# Patient Record
Sex: FEMALE | URBAN - METROPOLITAN AREA
[De-identification: names, ages, dates, MRNs, and addresses within clinical notes are randomized per-mention and may not be internally consistent; named-entity substitution may affect disease eponyms.]

---

## 2023-11-09 ENCOUNTER — OFFICE VISIT (OUTPATIENT)
Dept: URBAN - METROPOLITAN AREA CLINIC 35 | Facility: CLINIC | Age: 23
End: 2023-11-09
Payer: COMMERCIAL

## 2023-11-09 VITALS
WEIGHT: 266 LBS | OXYGEN SATURATION: 98 % | HEART RATE: 93 BPM | BODY MASS INDEX: 42.75 KG/M2 | DIASTOLIC BLOOD PRESSURE: 84 MMHG | SYSTOLIC BLOOD PRESSURE: 132 MMHG | HEIGHT: 66 IN

## 2023-11-09 DIAGNOSIS — R19.4 CHANGE IN BOWEL HABITS: ICD-10-CM

## 2023-11-09 DIAGNOSIS — R19.7 DIARRHEA, UNSPECIFIED TYPE: ICD-10-CM

## 2023-11-09 DIAGNOSIS — K92.1 HEMATOCHEZIA: ICD-10-CM

## 2023-11-09 DIAGNOSIS — Z83.719 FAMILY HISTORY OF COLONIC POLYPS: ICD-10-CM

## 2023-11-09 PROCEDURE — 99204 OFFICE O/P NEW MOD 45 MIN: CPT | Performed by: PHYSICIAN ASSISTANT

## 2023-11-09 RX ORDER — LEVOTHYROXINE SODIUM 150 UG/1
1 CAPSULE IN THE MORNING ON AN EMPTY STOMACH CAPSULE ORAL ONCE A DAY
Status: ACTIVE | COMMUNITY

## 2023-11-09 RX ORDER — SODIUM PICOSULFATE, MAGNESIUM OXIDE, AND ANHYDROUS CITRIC ACID 12; 3.5; 1 G/175ML; G/175ML; MG/175ML
175 ML THE FIRST DOSE THE EVENING BEFORE AND SECOND DOSE THE MORNING OF COLONOSCOPY LIQUID ORAL ONCE A DAY
Qty: 350 | Refills: 0 | OUTPATIENT
Start: 2023-11-09 | End: 2023-11-11

## 2023-11-09 NOTE — HPI-BLOOD IN STOOL
23 year old female patient presents today for a consultation about blood in stool. Bleeding started about 3 weeks ago (possibly before) and happens a few times a week. The blood is bright red and is present on tissue and within the water bowl and is enough to stain the water red (at times). Patient currently admits 4-5 bowel movements per day, with some strain. Her stools range from normal to loose. She denies any mucus or melena in stools. She admits to more loose stools than normal.  Occasional nocturnal diarrhea, no weight loss.  She has been having more of an issue for the past month.  She admits to fecal urgency.  Eating does not necessarily provoke symptoms.  She admits rectal pain or pruritus ani. Denies any associated abdominal pain or cramping. In the past, patient was told she possibly had "leaky gut syndrome" but this was never explored. Patient has never had a colonoscopy. Her mother started getting colonoscopies at age 30 and has a history of colon polyps. Patient denies a known family history of colon cancer.

## 2023-12-01 ENCOUNTER — LAB OUTSIDE AN ENCOUNTER (OUTPATIENT)
Dept: URBAN - METROPOLITAN AREA CLINIC 35 | Facility: CLINIC | Age: 23
End: 2023-12-01

## 2023-12-02 LAB
ALBUMIN/GLOBULIN RATIO: 1.6
ALBUMIN: 4.4
ALKALINE PHOSPHATASE: 97
ALT: 15
AST: 14
BILIRUBIN, TOTAL: 0.2
BUN/CREATININE RATIO: (no result)
CALCIUM: 8.8
CARBON DIOXIDE: 23
CHLORIDE: 103
CREATININE: 0.61
GLOBULIN: 2.7
GLUCOSE: 85
HEMATOCRIT: 37.1
HEMOGLOBIN: 11.9
MCH: 25.8
MCHC: 32.1
MCV: 80.3
MPV: 11
PLATELET COUNT: 362
POTASSIUM: 4.2
PROTEIN, TOTAL: 7.1
RDW: 13.9
RED BLOOD CELL COUNT: 4.62
SODIUM: 138
UREA NITROGEN (BUN): 15
WHITE BLOOD CELL COUNT: 10.7

## 2023-12-05 ENCOUNTER — TELEPHONE ENCOUNTER (OUTPATIENT)
Dept: URBAN - METROPOLITAN AREA CLINIC 35 | Facility: CLINIC | Age: 23
End: 2023-12-05

## 2023-12-06 ENCOUNTER — TELEPHONE ENCOUNTER (OUTPATIENT)
Dept: URBAN - METROPOLITAN AREA CLINIC 35 | Facility: CLINIC | Age: 23
End: 2023-12-06

## 2023-12-11 ENCOUNTER — CLAIMS CREATED FROM THE CLAIM WINDOW (OUTPATIENT)
Dept: URBAN - METROPOLITAN AREA SURGERY CENTER 8 | Facility: SURGERY CENTER | Age: 23
End: 2023-12-11
Payer: COMMERCIAL

## 2023-12-11 ENCOUNTER — CLAIMS CREATED FROM THE CLAIM WINDOW (OUTPATIENT)
Dept: URBAN - METROPOLITAN AREA CLINIC 4 | Facility: CLINIC | Age: 23
End: 2023-12-11
Payer: COMMERCIAL

## 2023-12-11 DIAGNOSIS — K64.0 GRADE I HEMORRHOIDS: ICD-10-CM

## 2023-12-11 DIAGNOSIS — K62.5 ANAL BLEEDING: ICD-10-CM

## 2023-12-11 DIAGNOSIS — K63.89 OTHER SPECIFIED DISEASES OF INTESTINE: ICD-10-CM

## 2023-12-11 DIAGNOSIS — K63.89 APPENDICITIS EPIPLOICA: ICD-10-CM

## 2023-12-11 DIAGNOSIS — R19.4 CHANGE IN BOWEL HABIT: ICD-10-CM

## 2023-12-11 DIAGNOSIS — R19.4 ALTERATION IN BOWEL ELIMINATION: ICD-10-CM

## 2023-12-11 DIAGNOSIS — K92.1 HEMATOCHEZIA: ICD-10-CM

## 2023-12-11 PROCEDURE — G8907 PT DOC NO EVENTS ON DISCHARG: HCPCS | Performed by: INTERNAL MEDICINE

## 2023-12-11 PROCEDURE — 45380 COLONOSCOPY AND BIOPSY: CPT | Performed by: INTERNAL MEDICINE

## 2023-12-11 PROCEDURE — 00811 ANES LWR INTST NDSC NOS: CPT | Performed by: NURSE ANESTHETIST, CERTIFIED REGISTERED

## 2023-12-11 PROCEDURE — 88342 IMHCHEM/IMCYTCHM 1ST ANTB: CPT | Performed by: PATHOLOGY

## 2023-12-11 PROCEDURE — 88313 SPECIAL STAINS GROUP 2: CPT | Performed by: PATHOLOGY

## 2023-12-11 PROCEDURE — 88305 TISSUE EXAM BY PATHOLOGIST: CPT | Performed by: PATHOLOGY

## 2024-01-02 ENCOUNTER — OFFICE VISIT (OUTPATIENT)
Dept: URBAN - METROPOLITAN AREA CLINIC 35 | Facility: CLINIC | Age: 24
End: 2024-01-02
Payer: COMMERCIAL

## 2024-01-02 ENCOUNTER — DASHBOARD ENCOUNTERS (OUTPATIENT)
Age: 24
End: 2024-01-02

## 2024-01-02 VITALS
OXYGEN SATURATION: 98 % | SYSTOLIC BLOOD PRESSURE: 120 MMHG | HEIGHT: 66 IN | BODY MASS INDEX: 44.03 KG/M2 | HEART RATE: 78 BPM | DIASTOLIC BLOOD PRESSURE: 64 MMHG | WEIGHT: 274 LBS

## 2024-01-02 DIAGNOSIS — Z83.719 FAMILY HISTORY OF COLONIC POLYPS: ICD-10-CM

## 2024-01-02 DIAGNOSIS — K64.0 GRADE I HEMORRHOIDS: ICD-10-CM

## 2024-01-02 DIAGNOSIS — K58.0 IRRITABLE BOWEL SYNDROME WITH DIARRHEA: ICD-10-CM

## 2024-01-02 DIAGNOSIS — K92.1 HEMATOCHEZIA: ICD-10-CM

## 2024-01-02 PROBLEM — 197125005: Status: ACTIVE | Noted: 2024-01-02

## 2024-01-02 PROCEDURE — 99214 OFFICE O/P EST MOD 30 MIN: CPT | Performed by: PHYSICIAN ASSISTANT

## 2024-01-02 RX ORDER — RIFAXIMIN 550 MG/1
1 TABLET TABLET ORAL THREE TIMES A DAY
Qty: 42 TABLET | Refills: 1 | OUTPATIENT
Start: 2024-01-02 | End: 2024-01-30

## 2024-01-02 RX ORDER — LEVOTHYROXINE SODIUM 150 UG/1
1 CAPSULE IN THE MORNING ON AN EMPTY STOMACH CAPSULE ORAL ONCE A DAY
Status: ACTIVE | COMMUNITY

## 2024-01-02 NOTE — HPI-COLONOSCOPY FOLLOWUP
23 year old female patient presents today for a follow up from her colonoscopy. She denies any complications after her procedure. She currently reports at least one to three bowel movements per day, without strain. Her stools are semi-formed and normal . She denies any mucus, melena or blood in stools. Denies any pruritus ani or rectal pain.  Impression Report:  - The examined portion of the ileum was normal. - Normal mucosa in the entire examined colon.  Biopsied. - The entire examined colon is normal. - Non-bleeding internal hemorrhoids.   Pathology Report:  Colon, Random Biopsy:NO SIGNIFICANT ABNORMALITY

## 2024-01-02 NOTE — HPI-BLOOD IN STOOL
At last visit, 23 year old female patient presents today for a consultation about blood in stool. Bleeding started about 3 weeks ago (possibly before) and happens a few times a week. The blood is bright red and is present on tissue and within the water bowl and is enough to stain the water red (at times). Patient currently admits 4-5 bowel movements per day, with some strain. Her stools range from normal to loose. She denies any mucus or melena in stools. She admits to more loose stools than normal.  Occasional nocturnal diarrhea, no weight loss.  She has been having more of an issue for the past month.  She admits to fecal urgency.  Eating does not necessarily provoke symptoms.  She admits rectal pain or pruritus ani. Denies any associated abdominal pain or cramping. In the past, patient was told she possibly had "leaky gut syndrome" but this was never explored. Patient has never had a colonoscopy. Her mother started getting colonoscopies at age 30 and has a history of colon polyps. Patient denies a known family history of colon cancer.

## 2024-02-12 ENCOUNTER — OV EP (OUTPATIENT)
Dept: URBAN - METROPOLITAN AREA CLINIC 35 | Facility: CLINIC | Age: 24
End: 2024-02-12

## 2024-02-12 RX ORDER — LEVOTHYROXINE SODIUM 150 UG/1
1 CAPSULE IN THE MORNING ON AN EMPTY STOMACH CAPSULE ORAL ONCE A DAY
Status: ACTIVE | COMMUNITY

## 2024-02-12 NOTE — HPI-BLOOD IN STOOL
23 year old female patient presents today for a follow up of blood in stool. She admits/denies continued blood in stool and rectal bleeding since her last visit. She admits/denies avoiding sitting on the toilet for long periods and taking Prep H supp BID as needed. She admits/denies completing the course of Xifaxin. She admits/denies following the FODMAP diet. She currently admits -- bowel movements per --. Stools are --.       At last visit, 23 year old female patient presents today for a consultation about blood in stool. Bleeding started about 3 weeks ago (possibly before) and happens a few times a week. The blood is bright red and is present on tissue and within the water bowl and is enough to stain the water red (at times). Patient currently admits 4-5 bowel movements per day, with some strain. Her stools range from normal to loose. She denies any mucus or melena in stools. She admits to more loose stools than normal.  Occasional nocturnal diarrhea, no weight loss.  She has been having more of an issue for the past month.  She admits to fecal urgency.  Eating does not necessarily provoke symptoms.  She admits rectal pain or pruritus ani. Denies any associated abdominal pain or cramping. In the past, patient was told she possibly had "leaky gut syndrome" but this was never explored. Patient has never had a colonoscopy. Her mother started getting colonoscopies at age 30 and has a history of colon polyps. Patient denies a known family history of colon cancer.